# Patient Record
Sex: MALE | HISPANIC OR LATINO | Employment: FULL TIME | URBAN - METROPOLITAN AREA
[De-identification: names, ages, dates, MRNs, and addresses within clinical notes are randomized per-mention and may not be internally consistent; named-entity substitution may affect disease eponyms.]

---

## 2022-04-17 ENCOUNTER — HOSPITAL ENCOUNTER (EMERGENCY)
Facility: HOSPITAL | Age: 32
Discharge: HOME | End: 2022-04-17
Attending: EMERGENCY MEDICINE | Admitting: EMERGENCY MEDICINE

## 2022-04-17 VITALS
RESPIRATION RATE: 18 BRPM | OXYGEN SATURATION: 99 % | HEART RATE: 86 BPM | TEMPERATURE: 97.4 F | SYSTOLIC BLOOD PRESSURE: 142 MMHG | BODY MASS INDEX: 31.1 KG/M2 | HEIGHT: 69 IN | DIASTOLIC BLOOD PRESSURE: 82 MMHG | WEIGHT: 210 LBS

## 2022-04-17 DIAGNOSIS — S05.01XA ABRASION OF RIGHT CORNEA, INITIAL ENCOUNTER: Primary | ICD-10-CM

## 2022-04-17 PROCEDURE — 99283 EMERGENCY DEPT VISIT LOW MDM: CPT

## 2022-04-17 RX ORDER — HYDROCODONE BITARTRATE AND ACETAMINOPHEN 7.5; 325 MG/1; MG/1
1 TABLET ORAL EVERY 6 HOURS PRN
Qty: 12 TABLET | Refills: 0 | Status: SHIPPED | OUTPATIENT
Start: 2022-04-17

## 2022-04-17 RX ORDER — ERYTHROMYCIN 5 MG/G
OINTMENT OPHTHALMIC
Qty: 3.5 G | Refills: 0 | Status: SHIPPED | OUTPATIENT
Start: 2022-04-17

## 2022-04-17 RX ORDER — PROPARACAINE HYDROCHLORIDE 5 MG/ML
2 SOLUTION/ DROPS OPHTHALMIC ONCE
Status: COMPLETED | OUTPATIENT
Start: 2022-04-17 | End: 2022-04-17

## 2022-04-17 RX ADMIN — FLUORESCEIN SODIUM 1 STRIP: 1 STRIP OPHTHALMIC at 02:30

## 2022-04-17 RX ADMIN — PROPARACAINE HYDROCHLORIDE 2 DROP: 5 SOLUTION/ DROPS OPHTHALMIC at 02:30

## 2022-04-17 NOTE — ED TRIAGE NOTES
Pt to ED via [facility] from home. Pt reports during yesterday afternoon, he was cutting vinyl wood when he believes a piece of hit either hit or got stuck in his right eye. Pt present with gauze and an eye patch on the right eye. Pt reports pain 8/10 in the right eye.     This RN in appropriate PPE for all patient care interactions. Pt masked and redirected for proper mask use when necessary. Hand hygiene performed before and after all patient care interactions.

## 2022-04-17 NOTE — ED PROVIDER NOTES
EMERGENCY DEPARTMENT ENCOUNTER    Room Number:  05/05  Date of encounter:  4/17/2022  PCP: Soo Norris MD  Historian: Patient      HPI:  Chief Complaint: Right eye pain  A complete HPI/ROS/PMH/PSH/SH/FH are unobtainable due to: N/A    Context: Price Mas is a 32 y.o. male who presents to the ED c/o right eye pain.  Patient states that he was using a jigsaw to cut vinyl planks when he felt like a piece of the vinyl sawdust struck him in the right eye.  Patient has had pain that is worse with movement of the eye and opening and closing of the eyelid.  Patient is able to see but opening the eye causes severe pain.  Denies any purulent drainage.  He does have watering and redness of the eye.  States that he received his tetanus vaccination in December.      PAST MEDICAL HISTORY  Active Ambulatory Problems     Diagnosis Date Noted   • No Active Ambulatory Problems     Resolved Ambulatory Problems     Diagnosis Date Noted   • No Resolved Ambulatory Problems     No Additional Past Medical History         PAST SURGICAL HISTORY  No past surgical history on file.      FAMILY HISTORY  No family history on file.      SOCIAL HISTORY  Social History     Socioeconomic History   • Marital status:          ALLERGIES  Patient has no known allergies.        REVIEW OF SYSTEMS  Review of Systems     All systems reviewed and negative except for those discussed in HPI.       PHYSICAL EXAM    I have reviewed the triage vital signs and nursing notes.    ED Triage Vitals [04/17/22 0106]   Temp Heart Rate Resp BP SpO2   97.4 °F (36.3 °C) 86 18 -- 99 %      Temp src Heart Rate Source Patient Position BP Location FiO2 (%)   Tympanic Monitor -- -- --       Physical Exam  GENERAL: Alert and orient x3, mildly distressed  HENT: TMs clear, rhinorrhea present, no pharyngeal erythema or tonsillar exudate  EYES: no scleral icterus, right eye injected, photophobia present, examined with proper game and fluorescein stain, a circular  corneal abrasion was revealed at around the 5 o'clock position, no foreign body present  CV: regular rhythm, regular rate  RESPIRATORY: normal effort  MUSCULOSKELETAL: no deformity  NEURO: alert, moves all extremities, follows commands  SKIN: warm, dry        LAB RESULTS  No results found for this or any previous visit (from the past 24 hour(s)).    Ordered the above labs and independently reviewed the results.        RADIOLOGY  No Radiology Exams Resulted Within Past 24 Hours    I ordered the above noted radiological studies. Reviewed by me and discussed with radiologist.  See dictation for official radiology interpretation.      PROCEDURES    Procedures      MEDICATIONS GIVEN IN ER    Medications   proparacaine (ALCAINE) 0.5 % ophthalmic solution 2 drop (2 drops Right Eye Given 4/17/22 0230)   fluorescein ophthalmic strip 1 strip (1 strip Both Eyes Given 4/17/22 0230)         PROGRESS, DATA ANALYSIS, CONSULTS, AND MEDICAL DECISION MAKING    All labs have been independently reviewed by me.  All radiology studies have been reviewed by me and discussed with radiologist dictating the report.   EKG's independently viewed and interpreted by me.  Discussion below represents my analysis of pertinent findings related to patient's condition, differential diagnosis, treatment plan and final disposition.    DDx: Includes but not limited to corneal abrasion, corneal foreign body, corneal ulcer, hyphema         MDM: Patient's exam shows a right-sided corneal abrasion, there is no evidence for an hyphema or penetrating globe injury.  Patient will be discharged with medications for pain and antibiotic ointment.    PPE: The patient wore a surgical mask throughout the entire patient encounter. I wore an N95.    AS OF 02:42 EDT VITALS:    BP - 135/88  HR - 86  TEMP - 97.4 °F (36.3 °C) (Tympanic)  O2 SATS - 99%        DIAGNOSIS  Final diagnoses:   Abrasion of right cornea, initial encounter         DISPOSITION  DISCHARGE    Patient  discharged in stable condition.    Reviewed implications of results, diagnosis, meds, responsibility to follow up, warning signs and symptoms of possible worsening, potential complications and reasons to return to ER.    Patient/Family voiced understanding of above instructions.    Discussed plan for discharge, as there is no emergent indication for admission. Patient referred to primary care provider for BP management due to today's BP. Pt/family is agreeable and understands need for follow up and repeat testing.  Pt is aware that discharge does not mean that nothing is wrong but it indicates no emergency is present that requires admission and they must continue care with follow-up as given below or physician of their choice.     FOLLOW-UP  Catarino Elder  [address] [place] [id number] [phone]    Schedule an appointment as soon as possible for a visit            Medication List      New Prescriptions    erythromycin 5 MG/GM ophthalmic ointment  Commonly known as: ROMYCIN  Administer  to the right eye Every 4 (Four) Hours While Awake.     HYDROcodone-acetaminophen 7.5-325 MG per tablet  Commonly known as: NORCO  Take 1 tablet by mouth Every 6 (Six) Hours As Needed for Moderate Pain .           Where to Get Your Medications      These medications were sent to [id number] - [place], [place] - [address] AT [facility] - [phone]  - [phone]   [address], [facility] [address]    Phone: [phone]   · erythromycin 5 MG/GM ophthalmic ointment  · HYDROcodone-acetaminophen 7.5-325 MG per tablet                    Rick Talley PA  04/17/22 0232       Rick Talley PA  04/17/22 0243

## 2022-04-17 NOTE — DISCHARGE INSTRUCTIONS
Home, rest, medicine as prescribed, follow up with ophthalmologist for recheck. Return to care with further concerns.

## 2022-04-17 NOTE — ED PROVIDER NOTES
MD ATTESTATION NOTE    The NAREN and I have discussed this patient's history, physical exam, and treatment plan.  I have reviewed the documentation and personally had a face to face interaction with the patient. I affirm the documentation and agree with the treatment and plan.  The attached note describes my personal findings.      I provided a substantive portion of the care of the patient.  I personally performed the physical exam in its entirety, and below are my findings.  For this patient encounter, the patient wore surgical mask, I wore full protective PPE including N95 and eye protection.      Brief HPI: 32-year-old gentleman complains of right eye pain after he was using a jigsaw to cut vinyl planks.  He felt something fly in his eye and now he has pain    PHYSICAL EXAM  ED Triage Vitals   Temp Heart Rate Resp BP SpO2   04/17/22 0106 04/17/22 0106 04/17/22 0106 04/17/22 0108 04/17/22 0106   97.4 °F (36.3 °C) 86 18 166/93 99 %      Temp src Heart Rate Source Patient Position BP Location FiO2 (%)   04/17/22 0106 04/17/22 0106 -- -- --   Tympanic Monitor            GENERAL: Awake and alert and uncomfortable  HENT: nares patent, the right eye has injected conjunctiva, and there is a decent size corneal abrasion on slit-lamp exam.  EYES: no scleral icterus  CV: regular rhythm, normal rate  RESPIRATORY: normal effort  ABDOMEN: soft  MUSCULOSKELETAL: no deformity  NEURO: alert, moves all extremities, follows commands  PSYCH:  calm, cooperative  SKIN: warm, dry    Vital signs and nursing notes reviewed.        Plan: Patient be discharged home with antibiotic ointment and some pain medicine along with ophthalmology follow-up     Checo Murray MD  04/17/22 3502